# Patient Record
Sex: FEMALE | Race: WHITE | Employment: FULL TIME | URBAN - METROPOLITAN AREA
[De-identification: names, ages, dates, MRNs, and addresses within clinical notes are randomized per-mention and may not be internally consistent; named-entity substitution may affect disease eponyms.]

---

## 2017-04-11 ENCOUNTER — HOSPITAL ENCOUNTER (OUTPATIENT)
Dept: MAMMOGRAPHY | Age: 47
Discharge: HOME | End: 2017-04-11
Attending: OBSTETRICS & GYNECOLOGY | Admitting: OBSTETRICS & GYNECOLOGY

## 2017-04-11 DIAGNOSIS — Z12.31 VISIT FOR SCREENING MAMMOGRAM: ICD-10-CM

## 2018-02-02 ENCOUNTER — HOSPITAL ENCOUNTER (OUTPATIENT)
Dept: MAMMOGRAPHY | Age: 48
Discharge: HOME | End: 2018-02-02
Attending: OBSTETRICS & GYNECOLOGY | Admitting: OBSTETRICS & GYNECOLOGY

## 2018-02-02 DIAGNOSIS — Z12.31 VISIT FOR SCREENING MAMMOGRAM: ICD-10-CM

## 2021-08-06 ENCOUNTER — HOSPITAL ENCOUNTER (OUTPATIENT)
Dept: MAMMOGRAPHY | Age: 51
Discharge: HOME | End: 2021-08-06
Payer: COMMERCIAL

## 2021-08-06 VITALS — HEIGHT: 67 IN | BODY MASS INDEX: 26.68 KG/M2 | WEIGHT: 170 LBS

## 2021-08-06 DIAGNOSIS — Z12.31 VISIT FOR SCREENING MAMMOGRAM: ICD-10-CM

## 2021-08-06 PROCEDURE — 77063 BREAST TOMOSYNTHESIS BI: CPT

## 2023-01-13 ENCOUNTER — HOSPITAL ENCOUNTER (OUTPATIENT)
Dept: MAMMOGRAPHY | Age: 53
Discharge: HOME | End: 2023-01-13
Payer: COMMERCIAL

## 2023-01-13 VITALS — HEIGHT: 67 IN | WEIGHT: 170 LBS | BODY MASS INDEX: 26.68 KG/M2

## 2023-01-13 DIAGNOSIS — Z12.31 VISIT FOR SCREENING MAMMOGRAM: ICD-10-CM

## 2023-01-13 PROCEDURE — 77067 SCR MAMMO BI INCL CAD: CPT

## 2024-01-31 ENCOUNTER — HOSPITAL ENCOUNTER (OUTPATIENT)
Dept: MAMMOGRAPHY | Age: 54
Discharge: HOME | End: 2024-01-31
Payer: COMMERCIAL

## 2024-01-31 VITALS — BODY MASS INDEX: 27.94 KG/M2 | HEIGHT: 67 IN | WEIGHT: 178 LBS

## 2024-01-31 DIAGNOSIS — Z12.31 VISIT FOR SCREENING MAMMOGRAM: ICD-10-CM

## 2024-01-31 PROCEDURE — 77063 BREAST TOMOSYNTHESIS BI: CPT

## 2024-08-02 ENCOUNTER — HOSPITAL ENCOUNTER (EMERGENCY)
Age: 54
Discharge: HOME | End: 2024-08-02
Attending: STUDENT IN AN ORGANIZED HEALTH CARE EDUCATION/TRAINING PROGRAM
Payer: COMMERCIAL

## 2024-08-02 VITALS
SYSTOLIC BLOOD PRESSURE: 113 MMHG | DIASTOLIC BLOOD PRESSURE: 70 MMHG | HEART RATE: 65 BPM | OXYGEN SATURATION: 96 % | TEMPERATURE: 98.2 F | RESPIRATION RATE: 16 BRPM

## 2024-08-02 DIAGNOSIS — S61.012A LACERATION OF LEFT THUMB WITHOUT FOREIGN BODY WITHOUT DAMAGE TO NAIL, INITIAL ENCOUNTER: Primary | ICD-10-CM

## 2024-08-02 PROCEDURE — 90715 TDAP VACCINE 7 YRS/> IM: CPT | Performed by: EMERGENCY MEDICINE

## 2024-08-02 PROCEDURE — 90471 IMMUNIZATION ADMIN: CPT | Performed by: EMERGENCY MEDICINE

## 2024-08-02 PROCEDURE — 12001 RPR S/N/AX/GEN/TRNK 2.5CM/<: CPT

## 2024-08-02 PROCEDURE — 6360000002 HC RX W HCPCS: Performed by: EMERGENCY MEDICINE

## 2024-08-02 PROCEDURE — 99284 EMERGENCY DEPT VISIT MOD MDM: CPT

## 2024-08-02 RX ORDER — LIDOCAINE HYDROCHLORIDE AND EPINEPHRINE 10; 10 MG/ML; UG/ML
20 INJECTION, SOLUTION INFILTRATION; PERINEURAL ONCE
Status: DISCONTINUED | OUTPATIENT
Start: 2024-08-02 | End: 2024-08-02 | Stop reason: HOSPADM

## 2024-08-02 RX ADMIN — TETANUS TOXOID, REDUCED DIPHTHERIA TOXOID AND ACELLULAR PERTUSSIS VACCINE, ADSORBED 0.5 ML: 5; 2.5; 8; 8; 2.5 SUSPENSION INTRAMUSCULAR at 12:48

## 2024-08-02 ASSESSMENT — PAIN DESCRIPTION - ORIENTATION: ORIENTATION: RIGHT

## 2024-08-02 ASSESSMENT — PAIN DESCRIPTION - ONSET: ONSET: SUDDEN

## 2024-08-02 ASSESSMENT — PAIN - FUNCTIONAL ASSESSMENT
PAIN_FUNCTIONAL_ASSESSMENT: PREVENTS OR INTERFERES SOME ACTIVE ACTIVITIES AND ADLS
PAIN_FUNCTIONAL_ASSESSMENT: 0-10

## 2024-08-02 ASSESSMENT — PAIN DESCRIPTION - FREQUENCY: FREQUENCY: CONTINUOUS

## 2024-08-02 ASSESSMENT — PAIN DESCRIPTION - PAIN TYPE: TYPE: ACUTE PAIN

## 2024-08-02 ASSESSMENT — PAIN SCALES - GENERAL: PAINLEVEL_OUTOF10: 6

## 2024-08-02 ASSESSMENT — PAIN DESCRIPTION - LOCATION: LOCATION: HAND

## 2024-08-02 ASSESSMENT — PAIN DESCRIPTION - DESCRIPTORS: DESCRIPTORS: SHARP

## 2024-08-02 NOTE — ED PROVIDER NOTES
THE [facility]  EMERGENCY DEPARTMENT ENCOUNTER          ATTENDING PHYSICIAN NOTE       Date of evaluation: 8/2/2024    Chief Complaint     Laceration       History of Present Illness     Jackie Chatterjee is a 53 y.o. female who presents to the emergency department for an accidental laceration to her left hand.  It is right near the MCP joint, on the ulnar side of the thumb.  Minor amount of stinging pain, and she feels tingling on the ulnar side of her thumb.  She is right-hand dominant.  This happened while she was using a knife, and slipped, accidentally cutting herself.    Denies any other injuries.  Otherwise in her usual state of health.    Review of Systems     Pertinent positives and negatives as described in the history of present illness.    Past Medical, Surgical, Family, and Social History     She has no past medical history on file.  She has no past surgical history on file.  Her family history is not on file.  She reports that she has never smoked. She has never used smokeless tobacco. She reports that she does not drink alcohol and does not use drugs.    Medications     Previous Medications    No medications on file       Allergies     She has No Known Allergies.    Physical Exam     INITIAL VITALS: /70   Pulse 65   Temp 98.2 °F (36.8 °C) (Oral)   Resp 16   LMP 07/16/2024   SpO2 96%    General: Well developed, well nourished female in no acute distress.  HEENT: Sclera white, conjunctiva pink, mucous membranes moist and oropharynx clear  Neck: Supple, no meningismus or JVD  Respirations: Unlabored with symmetric chest rise and fall  CV: Regular rate and rhythm with strong distal pulses; brisk capillary refill in the thumb  Musculoskeletal: Moves all extremities with no signs of orthopedic trauma or edema.  Skin: Warm and dry with no rashes or lesions.  1 cm hemostatic laceration near the MCP joint of her left thumb, ulnar aspect.  Neuro: Awake and alert with no focal neurologic deficits.

## 2024-08-02 NOTE — DISCHARGE INSTRUCTIONS
You can use soap and water, but otherwise keep your hand clean and dry.    Sutures out in 5 to 7 days.  This can be done to your PCP, an urgent care, or the emergency department.    Seek reevaluation for concern of infection, any other urgent concerns.

## 2025-01-31 ENCOUNTER — HOSPITAL ENCOUNTER (OUTPATIENT)
Dept: MAMMOGRAPHY | Age: 55
Discharge: HOME | End: 2025-01-31
Payer: COMMERCIAL

## 2025-01-31 VITALS — WEIGHT: 178 LBS | HEIGHT: 67 IN | BODY MASS INDEX: 27.94 KG/M2

## 2025-01-31 DIAGNOSIS — Z12.31 VISIT FOR SCREENING MAMMOGRAM: ICD-10-CM

## 2025-01-31 PROCEDURE — 77063 BREAST TOMOSYNTHESIS BI: CPT
